# Patient Record
Sex: MALE | Race: WHITE | NOT HISPANIC OR LATINO | Employment: OTHER | URBAN - METROPOLITAN AREA
[De-identification: names, ages, dates, MRNs, and addresses within clinical notes are randomized per-mention and may not be internally consistent; named-entity substitution may affect disease eponyms.]

---

## 2018-11-26 ENCOUNTER — TRANSCRIBE ORDERS (OUTPATIENT)
Dept: ADMINISTRATIVE | Facility: HOSPITAL | Age: 73
End: 2018-11-26

## 2018-11-26 ENCOUNTER — APPOINTMENT (OUTPATIENT)
Dept: LAB | Facility: HOSPITAL | Age: 73
End: 2018-11-26

## 2018-11-26 ENCOUNTER — TRANSCRIBE ORDERS (OUTPATIENT)
Dept: LAB | Facility: HOSPITAL | Age: 73
End: 2018-11-26

## 2018-11-26 ENCOUNTER — HOSPITAL ENCOUNTER (OUTPATIENT)
Dept: GENERAL RADIOLOGY | Facility: HOSPITAL | Age: 73
Discharge: HOME | End: 2018-11-26
Attending: ORTHOPAEDIC SURGERY | Admitting: ORTHOPAEDIC SURGERY

## 2018-11-26 DIAGNOSIS — Z01.818 PRE-OP EXAMINATION: Primary | ICD-10-CM

## 2018-11-26 DIAGNOSIS — Z87.68 PERSONAL HISTORY OF PERINATAL PROBLEMS: ICD-10-CM

## 2018-11-26 DIAGNOSIS — R73.09 OTHER ABNORMAL GLUCOSE: ICD-10-CM

## 2018-11-26 DIAGNOSIS — Z01.818 PREOP EXAMINATION: Primary | ICD-10-CM

## 2018-11-26 LAB
ANION GAP SERPL CALCULATED.3IONS-SCNC: 6 MMOL/L (ref 3–11)
BUN BLD-MCNC: 11 MG/DL (ref 9–23)
BUN/CREAT SERPL: 12.1 (ref 7–25)
CALCIUM SPEC-SCNC: 9.6 MG/DL (ref 8.7–10.4)
CHLORIDE SERPL-SCNC: 103 MMOL/L (ref 99–109)
CO2 SERPL-SCNC: 29 MMOL/L (ref 20–31)
CREAT BLD-MCNC: 0.91 MG/DL (ref 0.6–1.3)
DEPRECATED RDW RBC AUTO: 47.3 FL (ref 37–54)
ERYTHROCYTE [DISTWIDTH] IN BLOOD BY AUTOMATED COUNT: 13.7 % (ref 11.3–14.5)
GFR SERPL CREATININE-BSD FRML MDRD: 82 ML/MIN/1.73
GLUCOSE BLD-MCNC: 85 MG/DL (ref 70–100)
HBA1C MFR BLD: 5.9 % (ref 4.8–5.6)
HCT VFR BLD AUTO: 40.6 % (ref 38.9–50.9)
HGB BLD-MCNC: 13.5 G/DL (ref 13.1–17.5)
MCH RBC QN AUTO: 31.5 PG (ref 27–31)
MCHC RBC AUTO-ENTMCNC: 33.3 G/DL (ref 32–36)
MCV RBC AUTO: 94.6 FL (ref 80–99)
PLATELET # BLD AUTO: 263 10*3/MM3 (ref 150–450)
PMV BLD AUTO: 10.6 FL (ref 6–12)
POTASSIUM BLD-SCNC: 4.2 MMOL/L (ref 3.5–5.5)
RBC # BLD AUTO: 4.29 10*6/MM3 (ref 4.2–5.76)
SODIUM BLD-SCNC: 138 MMOL/L (ref 132–146)
WBC NRBC COR # BLD: 7.39 10*3/MM3 (ref 3.5–10.8)

## 2018-11-26 PROCEDURE — 80048 BASIC METABOLIC PNL TOTAL CA: CPT | Performed by: ORTHOPAEDIC SURGERY

## 2018-11-26 PROCEDURE — 71046 X-RAY EXAM CHEST 2 VIEWS: CPT

## 2018-11-26 PROCEDURE — 93010 ELECTROCARDIOGRAM REPORT: CPT | Performed by: INTERNAL MEDICINE

## 2018-11-26 PROCEDURE — 83036 HEMOGLOBIN GLYCOSYLATED A1C: CPT | Performed by: ORTHOPAEDIC SURGERY

## 2018-11-26 PROCEDURE — 85027 COMPLETE CBC AUTOMATED: CPT | Performed by: ORTHOPAEDIC SURGERY

## 2018-11-26 PROCEDURE — 93005 ELECTROCARDIOGRAM TRACING: CPT | Performed by: ORTHOPAEDIC SURGERY

## 2018-11-26 PROCEDURE — 36415 COLL VENOUS BLD VENIPUNCTURE: CPT | Performed by: ORTHOPAEDIC SURGERY

## 2020-02-13 ENCOUNTER — LAB REQUISITION (OUTPATIENT)
Dept: LAB | Facility: HOSPITAL | Age: 75
End: 2020-02-13

## 2020-02-13 ENCOUNTER — TRANSCRIBE ORDERS (OUTPATIENT)
Dept: ADMINISTRATIVE | Facility: HOSPITAL | Age: 75
End: 2020-02-13

## 2020-02-13 ENCOUNTER — HOSPITAL ENCOUNTER (OUTPATIENT)
Dept: GENERAL RADIOLOGY | Facility: HOSPITAL | Age: 75
Discharge: HOME | End: 2020-02-13
Admitting: ORTHOPAEDIC SURGERY

## 2020-02-13 ENCOUNTER — LAB (OUTPATIENT)
Dept: LAB | Facility: HOSPITAL | Age: 75
End: 2020-02-13

## 2020-02-13 DIAGNOSIS — Z01.811 PRE-OP CHEST EXAM: Primary | ICD-10-CM

## 2020-02-13 DIAGNOSIS — Z01.818 OTHER SPECIFIED PRE-OPERATIVE EXAMINATION: Primary | ICD-10-CM

## 2020-02-13 DIAGNOSIS — Z01.811 PRE-OP CHEST EXAM: ICD-10-CM

## 2020-02-13 DIAGNOSIS — Z00.00 ROUTINE GENERAL MEDICAL EXAMINATION AT A HEALTH CARE FACILITY: ICD-10-CM

## 2020-02-13 PROCEDURE — 93005 ELECTROCARDIOGRAM TRACING: CPT

## 2020-02-13 PROCEDURE — 71046 X-RAY EXAM CHEST 2 VIEWS: CPT

## 2020-02-13 PROCEDURE — 93010 ELECTROCARDIOGRAM REPORT: CPT | Performed by: INTERNAL MEDICINE

## 2020-02-13 PROCEDURE — 36415 COLL VENOUS BLD VENIPUNCTURE: CPT | Performed by: ORTHOPAEDIC SURGERY

## 2022-12-09 ENCOUNTER — TRANSCRIBE ORDERS (OUTPATIENT)
Dept: CARDIOLOGY | Facility: CLINIC | Age: 77
End: 2022-12-09

## 2022-12-09 DIAGNOSIS — R00.2 PALPITATIONS: Primary | ICD-10-CM

## 2023-04-11 PROBLEM — E78.5 HYPERLIPIDEMIA LDL GOAL <100: Status: ACTIVE | Noted: 2023-04-11

## 2023-04-11 PROBLEM — I49.3 PVC'S (PREMATURE VENTRICULAR CONTRACTIONS): Status: ACTIVE | Noted: 2023-04-11

## 2023-04-11 NOTE — PROGRESS NOTES
OFFICE VISIT  NOTE  [facility] CARDIOLOGY [place] [facility] GEN      Name: Akbar Berrios    Date: 2023  MRN:  [id number]  :  1945      REFERRING/PRIMARY PROVIDER:  Juanito Waldrop MD    Chief Complaint   Patient presents with   • PVC'S    • Shortness of Breath   • Dizziness       HPI: Akbar Berrios is a 77 y.o. male who presents today for new consultation for recurrent palpitations.  Reports palpitations worsening over the last 2-3 months, Holter 2022 1 for less than 2 days showed rare PACs and PVCs but symptomatic with them.  He reports palpitations occurring most days of the week, feels like skipped beats he still works out daily, drink 64 ounces of water, minimal caffeine, no alcohol.  Does have lymphocytic colitis which she takes budesonide 1 tablet daily.  Denies chest pain or shortness of breath.  Was told he had mitral valve prolapse many years ago.  Has hyperlipidemia on atorvastatin since the mid .  Had COVID 2022    Past Medical History:   Diagnosis Date   • Arthritis    • Cancer    • Colitis        Past Surgical History:   Procedure Laterality Date   • BACK SURGERY     • COLONOSCOPY     • KNEE SURGERY Left    • PROSTATECTOMY     • RETINAL DETACHMENT REPAIR  2016       Social History     Socioeconomic History   • Marital status:    Tobacco Use   • Smoking status: Never     Passive exposure: Never   • Smokeless tobacco: Never   Vaping Use   • Vaping Use: Never used   Substance and Sexual Activity   • Alcohol use: Not Currently   • Drug use: Never   • Sexual activity: Defer       Family History   Problem Relation Age of Onset   • Alzheimer's disease Mother    • Alzheimer's disease Father    • No Known Problems Brother    • No Known Problems Brother    • No Known Problems Brother         ROS:   Constitutional no fever,  no weight loss   Skin no rash, no subcutaneous nodules   Otolaryngeal no difficulty swallowing   Cardiovascular See HPI  "  Pulmonary no cough, no sputum production   Gastrointestinal no constipation, no diarrhea   Genitourinary no dysuria, no hematuria   Hematologic no easy bruisability, no abnormal bleeding   Musculoskeletal no muscle pain   Neurologic no dizziness, no falls         Allergies   Allergen Reactions   • Morphine Hives         Current Outpatient Medications:   •  aspirin 81 MG EC tablet, Take 1 tablet by mouth Daily., Disp: , Rfl:   •  atorvastatin (LIPITOR) 10 MG tablet, TAKE 1 TABLET BY MOUTH EVERY DAY FOR 90 DAYS, Disp: , Rfl:   •  BUDESONIDE ER PO, Take 1 capsule by mouth Daily., Disp: , Rfl:   •  Calcium Carbonate-Vit D-Min (CALCIUM 1200 PO), Take  by mouth Daily., Disp: , Rfl:   •  Omega-3 Fatty Acids (fish oil) 1000 MG capsule capsule, Take 1,200 mg by mouth 2 (Two) Times a Day With Meals., Disp: , Rfl:   •  Probiotic Product (ALIGN PO), Take  by mouth Daily., Disp: , Rfl:   •  Triamcinolone Acetonide (NASACORT) 55 MCG/ACT nasal inhaler, 2 sprays into the nostril(s) as directed by provider Daily., Disp: , Rfl:     Vitals:    04/12/23 0953   BP: 138/78   BP Location: Right arm   Patient Position: Sitting   Pulse: 76   SpO2: 96%   Weight: 78.4 kg (172 lb 12.8 oz)   Height: 180.3 cm (71\")     Body mass index is 24.1 kg/m².    PHYSICAL EXAM:    General Appearance:   · well developed  · well nourished  HENT:   · oropharynx moist  · lips not cyanotic  Neck:  · thyroid not enlarged  · supple  Respiratory:  · no respiratory distress  · normal breath sounds  · no rales  Cardiovascular:  · no jugular venous distention  · regular rhythm  · apical impulse normal  · S1 normal, S2 normal  · no S3, no S4   · no murmur  · no rub, no thrill  · carotid pulses normal; no bruit  · lower extremity edema: none      Musculoskeletal:  · no clubbing of fingers.   · normocephalic, head atraumatic  Skin:   · warm, dry  Psychiatric:  · judgement and insight appropriate  · normal mood and affect    RESULTS:   Procedures          Labs:  CBC " 1/23/23   WBC 5.2   RBC 4.23   Hemoglobin 13.5   Hematocrit 41.1   Platelet 257       CMP 1/23/23   Glucose 91   BUN 6 L   Creatinine 0.79   Glomerular Filtration Rate 88   BUN/Creatinine Ratio 8   Sodium 135   Potassium 4.3   Chloride 100   Carbon Dioxide 28   Calcium 9.8   AST 18   ALT 11       LIPID PANEL 1/23/23   Total 170   Triglycerides 119   HDL 73   LDL 76       HGB A1C 5.4   PSA <0.04   TSH 2.43       Most recent PCP note, imaging tests, and labs reviewed.    ASSESSMENT:  Problem List Items Addressed This Visit        Cardiac and Vasculature    PVC's (premature ventricular contractions) - Primary    Overview     12/2022 48-hour Holter: Rare PAC`S and PVC`S less than 1%, complaints of heart racing correlates with PAC`S, short run atrial tachycardia 3 beats asymptomatic           Hyperlipidemia LDL goal <100    Relevant Medications    atorvastatin (LIPITOR) 10 MG tablet       PLAN:    1.  Palpitations:  Possible exacerbated by COVID in June 2022  Place 14-day Holter monitor today, we discussed the pathophysiology of extrasystolic beats and when to be concerned  Overall minimal symptoms, continue exercise and current lifestyle regimen of hydration low caffeine and good sleep habits  Reviewed previous Holter monitor, benign findings.    2.  Hyperlipidemia:  Goal to less than 100  Continue atorvastatin 10 mg a day  Low saturated fat diet and exercise recommended    3.  Lymphocytic colitis:  Discussed possibility of decreasing steroid to minimize palpitations but if he cannot tolerate lower dose of steroid due to GI symptoms I am okay with continuing it    4.  History mitral prolapse:  Check echocardiogram    Advance Care Planning   ACP discussion was held with the patient during this visit. Patient does not have an advance directive, information provided.         Return to clinic in 6 months, or sooner as needed.    Thank you for the opportunity to share in the care of your patient; please do not hesitate to  call me with any questions.     Hilario Helms MD, FACC, [facility]  Office: [phone] [address] [place] [address]    04/12/23

## 2023-04-12 ENCOUNTER — OFFICE VISIT (OUTPATIENT)
Dept: CARDIOLOGY | Facility: CLINIC | Age: 78
End: 2023-04-12
Payer: MEDICARE

## 2023-04-12 VITALS
WEIGHT: 172.8 LBS | DIASTOLIC BLOOD PRESSURE: 78 MMHG | BODY MASS INDEX: 24.19 KG/M2 | OXYGEN SATURATION: 96 % | HEART RATE: 76 BPM | HEIGHT: 71 IN | SYSTOLIC BLOOD PRESSURE: 138 MMHG

## 2023-04-12 DIAGNOSIS — E78.5 HYPERLIPIDEMIA LDL GOAL <100: ICD-10-CM

## 2023-04-12 DIAGNOSIS — I49.3 PVC'S (PREMATURE VENTRICULAR CONTRACTIONS): Primary | ICD-10-CM

## 2023-04-12 DIAGNOSIS — R06.02 SOB (SHORTNESS OF BREATH): ICD-10-CM

## 2023-04-12 DIAGNOSIS — R42 DIZZINESS: ICD-10-CM

## 2023-04-12 RX ORDER — CHLORAL HYDRATE 500 MG
1200 CAPSULE ORAL 2 TIMES DAILY WITH MEALS
COMMUNITY

## 2023-04-12 RX ORDER — ASPIRIN 81 MG/1
81 TABLET ORAL DAILY
COMMUNITY

## 2023-04-12 RX ORDER — ATORVASTATIN CALCIUM 10 MG/1
TABLET, FILM COATED ORAL
COMMUNITY
Start: 2023-03-31

## 2023-04-12 RX ORDER — TRIAMCINOLONE ACETONIDE 55 UG/1
2 SPRAY, METERED NASAL DAILY
COMMUNITY

## 2023-04-20 ENCOUNTER — TELEPHONE (OUTPATIENT)
Dept: CARDIOLOGY | Facility: CLINIC | Age: 78
End: 2023-04-20
Payer: MEDICARE

## 2023-04-20 NOTE — TELEPHONE ENCOUNTER
----- Message from Hilario Helms MD sent at 4/20/2023  9:26 AM EDT -----  Please inform the patient of their test results. Thank you.

## 2023-04-20 NOTE — TELEPHONE ENCOUNTER
Spoke with pt regarding echo results. Answered questions on how long to wear Holter for 14 days. No further questions at this time.

## 2023-04-28 ENCOUNTER — TELEPHONE (OUTPATIENT)
Dept: CARDIOLOGY | Facility: CLINIC | Age: 78
End: 2023-04-28
Payer: MEDICARE

## 2023-05-11 ENCOUNTER — TELEPHONE (OUTPATIENT)
Dept: CARDIOLOGY | Facility: CLINIC | Age: 78
End: 2023-05-11
Payer: MEDICARE

## 2023-05-11 NOTE — TELEPHONE ENCOUNTER
----- Message from Hilario Helms MD sent at 5/10/2023  4:47 PM EDT -----  Please inform the patient of their test results.  Recommend starting metoprolol 12.5 mg twice daily and increase to 25 mg twice daily in 2 weeks if tolerating. Thank you.

## 2023-05-11 NOTE — TELEPHONE ENCOUNTER
Spoke with pt regarding Holter results. Pt would like to hold off on trial of metoprolol as of right now. He will let us know if he changes his mind.